# Patient Record
Sex: MALE | Race: WHITE | NOT HISPANIC OR LATINO | Employment: UNEMPLOYED | URBAN - NONMETROPOLITAN AREA
[De-identification: names, ages, dates, MRNs, and addresses within clinical notes are randomized per-mention and may not be internally consistent; named-entity substitution may affect disease eponyms.]

---

## 2023-02-09 ENCOUNTER — OFFICE VISIT (OUTPATIENT)
Dept: CARDIOLOGY | Facility: CLINIC | Age: 53
End: 2023-02-09
Payer: MEDICARE

## 2023-02-09 VITALS
DIASTOLIC BLOOD PRESSURE: 68 MMHG | BODY MASS INDEX: 26.34 KG/M2 | HEIGHT: 68 IN | SYSTOLIC BLOOD PRESSURE: 114 MMHG | WEIGHT: 173.8 LBS | HEART RATE: 99 BPM

## 2023-02-09 DIAGNOSIS — E03.9 HYPOTHYROIDISM (ACQUIRED): ICD-10-CM

## 2023-02-09 DIAGNOSIS — R07.89 OTHER CHEST PAIN: Primary | ICD-10-CM

## 2023-02-09 DIAGNOSIS — E11.9 TYPE 2 DIABETES MELLITUS WITHOUT COMPLICATION, WITHOUT LONG-TERM CURRENT USE OF INSULIN: ICD-10-CM

## 2023-02-09 DIAGNOSIS — E78.00 HYPERCHOLESTEROLEMIA: ICD-10-CM

## 2023-02-09 PROCEDURE — 99204 OFFICE O/P NEW MOD 45 MIN: CPT | Performed by: NURSE PRACTITIONER

## 2023-02-09 PROCEDURE — 93000 ELECTROCARDIOGRAM COMPLETE: CPT | Performed by: NURSE PRACTITIONER

## 2023-02-09 RX ORDER — OLANZAPINE 10 MG/1
10 TABLET ORAL EVERY MORNING
COMMUNITY

## 2023-02-09 RX ORDER — SODIUM CHLORIDE 1000 MG
1 TABLET, SOLUBLE MISCELLANEOUS DAILY
COMMUNITY

## 2023-02-09 RX ORDER — DIVALPROEX SODIUM 250 MG/1
250 TABLET, DELAYED RELEASE ORAL DAILY
COMMUNITY

## 2023-02-09 RX ORDER — ATORVASTATIN CALCIUM 20 MG/1
20 TABLET, FILM COATED ORAL NIGHTLY
COMMUNITY

## 2023-02-09 RX ORDER — ASPIRIN 81 MG/1
81 TABLET ORAL DAILY
COMMUNITY

## 2023-02-09 RX ORDER — TOLNAFTATE 1 %
AEROSOL, SPRAY (GRAM) TOPICAL AS NEEDED
COMMUNITY

## 2023-02-09 RX ORDER — ACETAMINOPHEN 500 MG
500 TABLET ORAL EVERY 8 HOURS PRN
COMMUNITY

## 2023-02-09 RX ORDER — FOLIC ACID 1 MG/1
1 TABLET ORAL DAILY
COMMUNITY

## 2023-02-09 RX ORDER — LORATADINE 10 MG/1
10 TABLET ORAL DAILY
COMMUNITY

## 2023-02-09 RX ORDER — DIVALPROEX SODIUM 500 MG/1
500 TABLET, DELAYED RELEASE ORAL 2 TIMES DAILY
COMMUNITY

## 2023-02-09 RX ORDER — CLONAZEPAM 1 MG/1
1 TABLET ORAL 2 TIMES DAILY
COMMUNITY

## 2023-02-09 RX ORDER — LEVOTHYROXINE SODIUM 88 UG/1
88 TABLET ORAL
COMMUNITY

## 2023-02-09 RX ORDER — FLUTICASONE PROPIONATE 50 MCG
1 SPRAY, SUSPENSION (ML) NASAL DAILY
COMMUNITY

## 2023-02-09 RX ORDER — FERROUS SULFATE 325(65) MG
325 TABLET ORAL
COMMUNITY

## 2023-02-09 RX ORDER — DOCUSATE SODIUM 100 MG/1
100 CAPSULE, LIQUID FILLED ORAL 2 TIMES DAILY
COMMUNITY

## 2023-02-09 RX ORDER — FENOFIBRATE 160 MG/1
160 TABLET ORAL DAILY
COMMUNITY

## 2023-02-09 RX ORDER — MULTIPLE VITAMINS W/ MINERALS TAB 9MG-400MCG
1 TAB ORAL DAILY
COMMUNITY

## 2023-02-09 RX ORDER — OMEPRAZOLE 20 MG/1
20 CAPSULE, DELAYED RELEASE ORAL DAILY
COMMUNITY

## 2023-02-09 RX ORDER — OLANZAPINE 20 MG/1
20 TABLET ORAL NIGHTLY
COMMUNITY

## 2023-02-09 RX ORDER — QUETIAPINE FUMARATE 100 MG/1
100 TABLET, FILM COATED ORAL NIGHTLY
COMMUNITY

## 2023-02-09 NOTE — PROGRESS NOTES
Subjective   Salo Zurita is a 53 y.o. male seen in the office today for initial cardiac consultation.  He is accompanied by his daytime adult  provider. Patient has history of seizures and anxiety managed with medication, followed by neurologist in [place].  At recent visit with primary he admitted to chest pressure and referral was made for further evaluation.  He also has type 2 diabetes, hypercholesterolemia, and hypothyroidism.  No prior pulmonary or cardiac issues noted.  There is no information on family history.  He is a non-smoker.    Chief Complaint   Patient presents with   • Establish Care     Referred per PCP for chest tightness   • Chest tightness     PCP referred for chest tightness, patient denied chest pain   • LABS     Current labs September 2022, in referral  PCP does labs every 3 months  and all were normal   • Hypertension     HTN is well controlled       Initial cardiac evaluation;    HPI    Cardiac History  No past surgical history on file.    Current Outpatient Medications   Medication Sig Dispense Refill   • acetaminophen (TYLENOL) 500 MG tablet Take 500 mg by mouth Every 8 (Eight) Hours As Needed for Mild Pain.     • aspirin 81 MG EC tablet Take 81 mg by mouth Daily.     • atorvastatin (LIPITOR) 20 MG tablet Take 20 mg by mouth Every Night.     • BENZTROPINE MESYLATE PO Take 0.5 mg by mouth Daily.     • clonazePAM (KlonoPIN) 1 MG tablet Take 1 mg by mouth 2 (Two) Times a Day.     • divalproex (DEPAKOTE) 250 MG DR tablet Take 250 mg by mouth Daily.     • divalproex (DEPAKOTE) 500 MG DR tablet Take 500 mg by mouth 2 (Two) Times a Day.     • docusate sodium (COLACE) 100 MG capsule Take 100 mg by mouth 2 (Two) Times a Day.     • fenofibrate 160 MG tablet Take 160 mg by mouth Daily.     • ferrous sulfate 325 (65 FE) MG tablet Take 325 mg by mouth Daily With Breakfast.     • fluticasone (FLONASE) 50 MCG/ACT nasal spray 1 spray into the nostril(s) as directed by provider Daily.     •  folic acid (FOLVITE) 1 MG tablet Take 1 mg by mouth Daily.     • levothyroxine (SYNTHROID, LEVOTHROID) 88 MCG tablet Take 88 mcg by mouth Every Morning.     • loratadine (CLARITIN) 10 MG tablet Take 10 mg by mouth Daily.     • metFORMIN (GLUCOPHAGE) 500 MG tablet Take 500 mg by mouth Daily With Breakfast.     • Miconazole Nitrate (Lotrimin AF Deodorant Powder) 2 % aerosol powder Apply  topically As Needed.     • multivitamin with minerals (MULTIVITAMIN ADULT PO) Take 1 tablet by mouth Daily.     • OLANZapine (zyPREXA) 10 MG tablet Take 10 mg by mouth Every Morning.     • OLANZapine (zyPREXA) 20 MG tablet Take 20 mg by mouth Every Night.     • omeprazole (priLOSEC) 20 MG capsule Take 20 mg by mouth Daily.     • QUEtiapine (SEROquel) 100 MG tablet Take 100 mg by mouth Every Night.     • sodium chloride 1 g tablet Take 1 g by mouth Daily. 1/2 tablet daily     • vitamin D3 125 MCG (5000 UT) capsule capsule Take 5,000 Units by mouth Daily.       No current facility-administered medications for this visit.       Grapefruit    Past Medical History:   Diagnosis Date   • Diabetes mellitus (HCC)    • Hyperlipidemia    • Hypertension    • Seizure (HCC)        Social History     Socioeconomic History   • Marital status: Single   Tobacco Use   • Smoking status: Never   Vaping Use   • Vaping Use: Never used   Substance and Sexual Activity   • Alcohol use: Never   • Drug use: Never       History reviewed. No pertinent family history.    Review of Systems   Constitutional: Negative for activity change, appetite change, fever and unexpected weight change.   HENT: Positive for nosebleeds (At times, none recent). Negative for hearing loss and trouble swallowing.    Respiratory: Negative for apnea, cough, chest tightness and shortness of breath.    Cardiovascular: Positive for chest pain. Negative for palpitations and leg swelling.   Endocrine: Negative for polydipsia, polyphagia and polyuria.   Genitourinary: Negative for difficulty  "urinating and hematuria.   Musculoskeletal: Positive for back pain. Negative for gait problem, myalgias, neck pain and neck stiffness.   Allergic/Immunologic: Positive for food allergies. Negative for immunocompromised state.   Neurological: Positive for tremors and seizures (None noted over the last year). Negative for dizziness, syncope, numbness and headaches.   Psychiatric/Behavioral: Negative for agitation, self-injury and sleep disturbance. The patient is nervous/anxious.        BP Readings from Last 5 Encounters:   02/09/23 114/68       Wt Readings from Last 5 Encounters:   02/09/23 78.8 kg (173 lb 12.8 oz)          Objective      Labs 9/1/2022: WBC 5.2, RBC 3.69, hemoglobin 11.9, hematocrit 34.6, platelets 210, glucose 94, BUN 6, creatinine 1.07, GFR 84, sodium 136, potassium 4.4, chloride 101, carbon oxide 18, calcium 9.5, total protein 10.6, albumin 4.6, total globulin 3, total bili 0.4, ALP 62, AST 31, ALT 28, total cholesterol 118, triglycerides 51, HDL 41, LDL 65, A1c 5.7, TSH 1.81, vitamin D 41.6    /68 (BP Location: Right arm, Patient Position: Sitting)   Pulse 99   Ht 172.7 cm (68\")   Wt 78.8 kg (173 lb 12.8 oz)   BMI 26.43 kg/m²     Physical Exam  Vitals and nursing note reviewed. Exam conducted with a chaperone present.   Constitutional:       Appearance: He is not ill-appearing.   HENT:      Head: Atraumatic.      Nose: Nose normal.      Mouth/Throat:      Pharynx: Oropharynx is clear.   Eyes:      Conjunctiva/sclera: Conjunctivae normal.   Neck:      Vascular: No carotid bruit.   Cardiovascular:      Rate and Rhythm: Normal rate and regular rhythm.      Pulses: Normal pulses.      Heart sounds: No murmur heard.  Pulmonary:      Effort: Pulmonary effort is normal.      Breath sounds: Normal breath sounds.   Musculoskeletal:         General: No swelling or tenderness.      Cervical back: No tenderness.   Skin:     General: Skin is warm and dry.   Neurological:      Mental Status: He is " "alert. Mental status is at baseline.      Gait: Gait is intact.   Psychiatric:         Mood and Affect: Mood normal.         Behavior: Behavior is cooperative.         Cognition and Memory: Cognition is impaired.           ECG 12 Lead    Date/Time: 2/9/2023 11:01 AM  Performed by: Sammi Marcano APRN  Authorized by: Sammi Marcano APRN   Previous ECG: no previous ECG available  Rhythm: sinus rhythm  BPM: 99  Conduction: incomplete right bundle branch block            Assessment & Plan     Diagnoses and all orders for this visit:    1. Other chest pain (Primary)  -     Stress Test With Myocardial Perfusion One Day; Future  -     Adult Transthoracic Echo Complete W/ Cont if Necessary Per Protocol; Future    2. Hypercholesterolemia  -     Stress Test With Myocardial Perfusion One Day; Future  -     Adult Transthoracic Echo Complete W/ Cont if Necessary Per Protocol; Future    3. Hypothyroidism (acquired)  -     Stress Test With Myocardial Perfusion One Day; Future  -     Adult Transthoracic Echo Complete W/ Cont if Necessary Per Protocol; Future    4. Type 2 diabetes mellitus without complication, without long-term current use of insulin (HCC)  -     Stress Test With Myocardial Perfusion One Day; Future  -     Adult Transthoracic Echo Complete W/ Cont if Necessary Per Protocol; Future    Other orders  -     ECG 12 Lead      Patient has some difficulty in relating health history stating \"I do not know\" to most questions.  He has had some chest pressure.  EKG shows sinus rhythm with incomplete right bundle branch block.  He has cardiac risk including diabetes and hypercholesterolemia.  Family history is unknown.  He has not had previous cardiac work-up.  To establish baseline cardiac status recommend echocardiogram and stress test.  We will proceed with chemical stress test and formal Lexiscan due to history of mental issues.  Continue statin and aspirin.    Currently his blood pressure is stable.  No change in " medications made at this time.  His heart rate is 99 which could be influenced from being anxious due to office visit today.  Advised to monitor heart rate.    Further recommendations based on test results.

## 2023-03-01 ENCOUNTER — HOSPITAL ENCOUNTER (OUTPATIENT)
Dept: CARDIOLOGY | Facility: HOSPITAL | Age: 53
Discharge: HOME | End: 2023-03-01
Payer: MEDICARE

## 2023-03-01 VITALS — HEIGHT: 68 IN | BODY MASS INDEX: 26.33 KG/M2 | WEIGHT: 173.72 LBS

## 2023-03-01 DIAGNOSIS — E11.9 TYPE 2 DIABETES MELLITUS WITHOUT COMPLICATION, WITHOUT LONG-TERM CURRENT USE OF INSULIN: ICD-10-CM

## 2023-03-01 DIAGNOSIS — R07.89 OTHER CHEST PAIN: ICD-10-CM

## 2023-03-01 DIAGNOSIS — E03.9 HYPOTHYROIDISM (ACQUIRED): ICD-10-CM

## 2023-03-01 DIAGNOSIS — E78.00 HYPERCHOLESTEROLEMIA: ICD-10-CM

## 2023-03-01 LAB
AORTIC DIMENSIONLESS INDEX: 0.85 (DI)
BH CV ECHO MEAS - ACS: 1.75 CM
BH CV ECHO MEAS - AO MAX PG: 4.6 MMHG
BH CV ECHO MEAS - AO MEAN PG: 2.5 MMHG
BH CV ECHO MEAS - AO ROOT DIAM: 3.7 CM
BH CV ECHO MEAS - AO V2 MAX: 107.2 CM/SEC
BH CV ECHO MEAS - AO V2 VTI: 21.9 CM
BH CV ECHO MEAS - EDV(CUBED): 130.6 ML
BH CV ECHO MEAS - EF_3D-VOL: 56 %
BH CV ECHO MEAS - ESV(CUBED): 34.8 ML
BH CV ECHO MEAS - FS: 35.6 %
BH CV ECHO MEAS - IVS/LVPW: 0.95 CM
BH CV ECHO MEAS - IVSD: 1.06 CM
BH CV ECHO MEAS - LA DIMENSION: 4.3 CM
BH CV ECHO MEAS - LAT PEAK E' VEL: 5.1 CM/SEC
BH CV ECHO MEAS - LV MASS(C)D: 209 GRAMS
BH CV ECHO MEAS - LV MAX PG: 3.4 MMHG
BH CV ECHO MEAS - LV MEAN PG: 1.41 MMHG
BH CV ECHO MEAS - LV V1 MAX: 91.8 CM/SEC
BH CV ECHO MEAS - LV V1 VTI: 19.1 CM
BH CV ECHO MEAS - LVIDD: 5.1 CM
BH CV ECHO MEAS - LVIDS: 3.3 CM
BH CV ECHO MEAS - LVPWD: 1.12 CM
BH CV ECHO MEAS - MED PEAK E' VEL: 7.1 CM/SEC
BH CV ECHO MEAS - MV A MAX VEL: 46 CM/SEC
BH CV ECHO MEAS - MV DEC SLOPE: 631.5 CM/SEC2
BH CV ECHO MEAS - MV DEC TIME: 0.19 MSEC
BH CV ECHO MEAS - MV E MAX VEL: 90.3 CM/SEC
BH CV ECHO MEAS - MV E/A: 1.96
BH CV ECHO MEAS - MV MAX PG: 3.6 MMHG
BH CV ECHO MEAS - MV MEAN PG: 2.04 MMHG
BH CV ECHO MEAS - MV P1/2T: 48.4 MSEC
BH CV ECHO MEAS - MV V2 VTI: 22.6 CM
BH CV ECHO MEAS - MVA(P1/2T): 4.5 CM2
BH CV ECHO MEAS - PA V2 MAX: 110.1 CM/SEC
BH CV ECHO MEAS - RAP SYSTOLE: 8 MMHG
BH CV ECHO MEAS - RV MAX PG: 3.9 MMHG
BH CV ECHO MEAS - RV V1 MAX: 98.5 CM/SEC
BH CV ECHO MEAS - RV V1 VTI: 19.4 CM
BH CV ECHO MEAS - RVDD: 4.4 CM
BH CV ECHO MEAS - RVSP: 31.7 MMHG
BH CV ECHO MEAS - TAPSE (>1.6): 2.41 CM
BH CV ECHO MEAS - TR MAX PG: 23.7 MMHG
BH CV ECHO MEAS - TR MAX VEL: 243.4 CM/SEC
BH CV ECHO MEASUREMENTS AVERAGE E/E' RATIO: 14.8
BH CV REST NUCLEAR ISOTOPE DOSE: 10 MCI
BH CV STRESS COMMENTS STAGE 1: NORMAL
BH CV STRESS DOSE REGADENOSON STAGE 1: 0.4
BH CV STRESS DURATION MIN STAGE 1: 0
BH CV STRESS DURATION SEC STAGE 1: 10
BH CV STRESS NUCLEAR ISOTOPE DOSE: 30 MCI
BH CV STRESS PROTOCOL 1: NORMAL
BH CV STRESS RECOVERY BP: NORMAL MMHG
BH CV STRESS RECOVERY HR: 103 BPM
BH CV STRESS STAGE 1: 1
BH CV XLRA - RV BASE: 3.7 CM
BH CV XLRA - RV LENGTH: 7.4 CM
BH CV XLRA - RV MID: 3.6 CM
BH CV XLRA - TDI S': 11.4 CM/SEC
LV EF NUC BP: 59 %
MAXIMAL PREDICTED HEART RATE: 168 BPM
MAXIMAL PREDICTED HEART RATE: 168 BPM
PERCENT MAX PREDICTED HR: 64.88 %
SINUS: 4 CM
STRESS BASELINE BP: NORMAL MMHG
STRESS BASELINE HR: 86 BPM
STRESS PERCENT HR: 76 %
STRESS POST PEAK BP: NORMAL MMHG
STRESS POST PEAK HR: 109 BPM
STRESS TARGET HR: 143 BPM
STRESS TARGET HR: 143 BPM

## 2023-03-01 PROCEDURE — 0 TECHNETIUM SESTAMIBI: Performed by: INTERNAL MEDICINE

## 2023-03-01 PROCEDURE — 78452 HT MUSCLE IMAGE SPECT MULT: CPT | Performed by: INTERNAL MEDICINE

## 2023-03-01 PROCEDURE — 93306 TTE W/DOPPLER COMPLETE: CPT

## 2023-03-01 PROCEDURE — 93306 TTE W/DOPPLER COMPLETE: CPT | Performed by: INTERNAL MEDICINE

## 2023-03-01 PROCEDURE — 93018 CV STRESS TEST I&R ONLY: CPT | Performed by: INTERNAL MEDICINE

## 2023-03-01 PROCEDURE — 78452 HT MUSCLE IMAGE SPECT MULT: CPT

## 2023-03-01 PROCEDURE — 25010000002 REGADENOSON 0.4 MG/5ML SOLUTION: Performed by: INTERNAL MEDICINE

## 2023-03-01 PROCEDURE — A9500 TC99M SESTAMIBI: HCPCS | Performed by: INTERNAL MEDICINE

## 2023-03-01 PROCEDURE — 93017 CV STRESS TEST TRACING ONLY: CPT

## 2023-03-01 RX ADMIN — REGADENOSON 0.4 MG: 0.08 INJECTION, SOLUTION INTRAVENOUS at 10:55

## 2023-03-01 RX ADMIN — TECHNETIUM TC 99M SESTAMIBI 1 DOSE: 1 INJECTION INTRAVENOUS at 10:54

## 2023-03-01 RX ADMIN — TECHNETIUM TC 99M SESTAMIBI 1 DOSE: 1 INJECTION INTRAVENOUS at 09:41

## 2023-08-17 PROBLEM — I25.3 ATRIAL SEPTAL ANEURYSM: Status: ACTIVE | Noted: 2023-08-17

## 2023-12-18 ENCOUNTER — OFFICE VISIT (OUTPATIENT)
Dept: CARDIOLOGY | Facility: CLINIC | Age: 53
End: 2023-12-18
Payer: MEDICARE

## 2023-12-18 VITALS
SYSTOLIC BLOOD PRESSURE: 114 MMHG | HEART RATE: 90 BPM | HEIGHT: 68 IN | WEIGHT: 176 LBS | DIASTOLIC BLOOD PRESSURE: 60 MMHG | BODY MASS INDEX: 26.67 KG/M2

## 2023-12-18 DIAGNOSIS — E03.9 HYPOTHYROIDISM (ACQUIRED): ICD-10-CM

## 2023-12-18 DIAGNOSIS — E11.9 TYPE 2 DIABETES MELLITUS WITHOUT COMPLICATION, WITHOUT LONG-TERM CURRENT USE OF INSULIN: ICD-10-CM

## 2023-12-18 DIAGNOSIS — E78.00 HYPERCHOLESTEROLEMIA: ICD-10-CM

## 2023-12-18 DIAGNOSIS — R07.89 OTHER CHEST PAIN: Primary | ICD-10-CM

## 2023-12-18 NOTE — PROGRESS NOTES
Chief Complaint   Patient presents with    Follow-up     Cardiac management    LABS     Has routine labs per PCP.    Med Refill     No refills needed today. PCP manges refills       Subjective       Salo Zurita is a 53 y.o. male initially seen February 2023 for cardiac consultation, accompanied by caregiver.  History includes seizures and anxiety, followed by neurologist in [place], DM type II, hypercholesterolemia, hypothyroidism.    On 3/1/23 Lexiscan stress test showed normal myocardial perfusion.  Echocardiogram showed mild LVH with EF around 60%.  Mild dilation of sinuses of Valsalva, 4 cm noted.  Right atrial cavity and ventricle mildly dilated.    Today he returns to the office for follow-up visit accompanied by caregiver.  He denies chest pain or palpitations.     Cardiac History:    Past Surgical History:   Procedure Laterality Date    CARDIOVASCULAR STRESS TEST  03/01/2023    Lexiscan- EF 59%. Negative    ECHO - CONVERTED  03/01/2023    EF 60%. LA- 3.8. Ao- 4.0. RHE. Mild MR. RVSP- 32 mmHg       Current Outpatient Medications   Medication Sig Dispense Refill    acetaminophen (TYLENOL) 500 MG tablet Take 1 tablet by mouth Every 8 (Eight) Hours As Needed for Mild Pain.      aspirin 81 MG EC tablet Take 1 tablet by mouth Daily.      atorvastatin (LIPITOR) 20 MG tablet Take 1 tablet by mouth Every Night.      BENZTROPINE MESYLATE PO Take 0.5 mg by mouth Daily.      clonazePAM (KlonoPIN) 1 MG tablet Take 1 tablet by mouth 2 (Two) Times a Day.      divalproex (DEPAKOTE) 250 MG DR tablet Take 1 tablet by mouth Daily.      divalproex (DEPAKOTE) 500 MG DR tablet Take 1 tablet by mouth 2 (Two) Times a Day.      docusate sodium (COLACE) 100 MG capsule Take 1 capsule by mouth 2 (Two) Times a Day.      fenofibrate 160 MG tablet Take 1 tablet by mouth Daily.      ferrous sulfate 325 (65 FE) MG tablet Take 1 tablet by mouth Daily With Breakfast.      fluticasone (FLONASE) 50 MCG/ACT nasal spray 1 spray into the  nostril(s) as directed by provider Daily.      folic acid (FOLVITE) 1 MG tablet Take 1 tablet by mouth Daily.      levothyroxine (SYNTHROID, LEVOTHROID) 88 MCG tablet Take 1 tablet by mouth Every Morning.      loratadine (CLARITIN) 10 MG tablet Take 1 tablet by mouth Daily.      Miconazole Nitrate (Lotrimin AF Deodorant Powder) 2 % aerosol powder Apply  topically As Needed.      multivitamin with minerals (MULTIVITAMIN ADULT PO) Take 1 tablet by mouth Daily.      OLANZapine (zyPREXA) 10 MG tablet Take 1 tablet by mouth Every Morning.      OLANZapine (zyPREXA) 20 MG tablet Take 1 tablet by mouth Every Night.      omeprazole (priLOSEC) 20 MG capsule Take 1 capsule by mouth Daily.      QUEtiapine (SEROquel) 100 MG tablet Take 1 tablet by mouth Every Night.      sodium chloride 1 g tablet Take 1 tablet by mouth Daily. 1/2 tablet daily       No current facility-administered medications for this visit.       Grapefruit    Past Medical History:   Diagnosis Date    Diabetes mellitus     Hyperlipidemia     Hypertension     Seizure        Social History     Socioeconomic History    Marital status: Single   Tobacco Use    Smoking status: Never   Vaping Use    Vaping Use: Never used   Substance and Sexual Activity    Alcohol use: Never    Drug use: Never       No family history on file.    Review of Systems   Cardiovascular:  Negative for chest pain, irregular heartbeat, leg swelling, near-syncope and palpitations.   Respiratory:  Negative for shortness of breath and sleep disturbances due to breathing.    Hematologic/Lymphatic: Negative for bleeding problem. Does not bruise/bleed easily.   Skin:  Negative for color change.   Neurological:  Positive for tremors. Negative for seizures and weakness.   Psychiatric/Behavioral:  Positive for altered mental status (by hx). Negative for memory loss. The patient is nervous/anxious.         BP Readings from Last 5 Encounters:   12/18/23 114/60   02/09/23 114/68       Wt Readings from  "Last 5 Encounters:   12/18/23 79.8 kg (176 lb)   03/01/23 78.8 kg (173 lb 11.6 oz)   02/09/23 78.8 kg (173 lb 12.8 oz)       Objective     Labs 9/1/2022: WBC 5.2, RBC 3.69, hemoglobin 11.9, hematocrit 34.6, platelets 210, glucose 94, BUN 6, creatinine 1.07, GFR 84, sodium 136, potassium 4.4, chloride 101, carbon oxide 18, calcium 9.5, total protein 10.6, albumin 4.6, total globulin 3, total bili 0.4, ALP 62, AST 31, ALT 28, total cholesterol 118, triglycerides 51, HDL 41, LDL 65, A1c 5.7, TSH 1.81, vitamin D 41.6     /60 (BP Location: Left arm, Patient Position: Sitting)   Pulse 90   Ht 172.7 cm (68\")   Wt 79.8 kg (176 lb)   BMI 26.76 kg/m²     Vitals and nursing note reviewed.   Constitutional:       Appearance: Not in distress.   Eyes:      Conjunctiva/sclera: Conjunctivae normal.   HENT:      Head: Normocephalic.   Neck:      Vascular: No carotid bruit.   Pulmonary:      Effort: Pulmonary effort is normal.      Breath sounds: Normal breath sounds.   Cardiovascular:      PMI at left midclavicular line. Normal rate. Regular rhythm.      Murmurs: There is no murmur.   Pulses:     Intact distal pulses.   Edema:     Peripheral edema absent.   Abdominal:      General: Bowel sounds are normal.      Palpations: Abdomen is soft.   Musculoskeletal:      Cervical back: Normal range of motion and neck supple. Skin:     General: Skin is warm and dry.   Neurological:      Mental Status: Alert, oriented to person, place, and time and oriented to person, place and time.          Procedures : none today          Assessment & Plan   Diagnoses and all orders for this visit:    1. Other chest pain (Primary)    2. Hypercholesterolemia    3. Type 2 diabetes mellitus without complication, without long-term current use of insulin    4. Hypothyroidism (acquired)      Chest pain  -Currently denies anginal symptoms  -Reports of nuclear stress test echocardiogram reviewed: No evidence of ischemia noted, mild dilation right atrium, " ventricle and sinuses of Valsalva  -Continue aspirin, statin, fenofibrate  -Monitor blood pressure    Hypercholesterolemia  -Labs managed through your office  -Continue statin, fenofibrate    Diabetes  -Recent echocardiogram without signs of heart failure  -No longer on metformin, diet controlled  -Labs managed through your office    Hypothyroidism  -On Synthroid    Annual follow-up visit scheduled.

## 2024-01-29 ENCOUNTER — TELEPHONE (OUTPATIENT)
Dept: CARDIOLOGY | Facility: CLINIC | Age: 54
End: 2024-01-29
Payer: MEDICARE

## 2024-01-29 NOTE — TELEPHONE ENCOUNTER
Received phone call from Radha , she reports that she has noticed SOA with exertion and he has been difficulty walking/exercising . He has not experienced slurred speech  at this time.

## 2024-01-31 NOTE — TELEPHONE ENCOUNTER
I spoke with Radha at  [facility]  and patient has not had any recent  medication changes  . His symptoms are unchanged  at this time.

## 2024-02-02 DIAGNOSIS — G45.9 TIA (TRANSIENT ISCHEMIC ATTACK): Primary | ICD-10-CM

## 2024-02-02 DIAGNOSIS — I25.3 ATRIAL SEPTAL ANEURYSM: ICD-10-CM

## 2024-02-02 NOTE — TELEPHONE ENCOUNTER
I spoke with Rayna at [facility] and made them aware of recommendation for LONNIE , they are in agreement to have testing ordered and scheduled .

## 2024-02-07 ENCOUNTER — TELEPHONE (OUTPATIENT)
Dept: CARDIOLOGY | Facility: CLINIC | Age: 54
End: 2024-02-07
Payer: MEDICARE

## 2024-02-07 NOTE — TELEPHONE ENCOUNTER
----- Message from Raya Chatman sent at 1/29/2024  1:02 PM EST -----  Regarding: FW: Appointment Request  Contact: [phone]  PLEASE REVIEW AND REACH OUT TO PATIENT. HE WAS JUST SEEN IN DECEMBER 2023  ----- Message -----  From: Salo Zurita  Sent: 1/29/2024  12:58 PM EST  To: [facility]  Subject: Appointment Request                              Appointment Request From: Salo uZrita    With Provider: Sammi Marcano [[facility]]    Preferred Date Range: 1/29/2024 – 2/12/2024    Preferred Times: Any Time    Reason for visit: Follow-up    Comments:  Problems walking for periods of time.